# Patient Record
Sex: MALE | URBAN - METROPOLITAN AREA
[De-identification: names, ages, dates, MRNs, and addresses within clinical notes are randomized per-mention and may not be internally consistent; named-entity substitution may affect disease eponyms.]

---

## 2019-08-12 ENCOUNTER — TELEPHONE (OUTPATIENT)
Dept: FAMILY MEDICINE | Facility: CLINIC | Age: 39
End: 2019-08-12

## 2019-08-12 NOTE — TELEPHONE ENCOUNTER
Number given below was tried, rang then disconnected.Will try again on 8/13/19. Sari Monsivais CMA

## 2019-08-12 NOTE — TELEPHONE ENCOUNTER
Reason for Call:  Same Day Appointment, Requested Provider:  Suleman Mock MD    PCP: Yue Bartholomew    Reason for visit: Vas consult    Duration of symptoms: N/A    Have you been treated for this in the past? No    Additional comments: Pt would like to schedule a vas consult    Can we leave a detailed message on this number? YES    Phone number patient can be reached at: Other phone number: [phone]    Best Time:     Call taken on 8/12/2019 at 1:59 PM by Eliane Garay

## 2019-08-13 NOTE — TELEPHONE ENCOUNTER
Called number below and left detailed message to call abck so we can schedule appt.  Terri Gomez